# Patient Record
Sex: FEMALE | Race: WHITE | NOT HISPANIC OR LATINO | URBAN - METROPOLITAN AREA
[De-identification: names, ages, dates, MRNs, and addresses within clinical notes are randomized per-mention and may not be internally consistent; named-entity substitution may affect disease eponyms.]

---

## 2021-09-13 ENCOUNTER — LAB OUTSIDE AN ENCOUNTER (OUTPATIENT)
Dept: URBAN - METROPOLITAN AREA CLINIC 40 | Facility: CLINIC | Age: 46
End: 2021-09-13

## 2021-09-13 ENCOUNTER — TELEPHONE ENCOUNTER (OUTPATIENT)
Dept: URBAN - METROPOLITAN AREA CLINIC 40 | Facility: CLINIC | Age: 46
End: 2021-09-13

## 2021-09-13 ENCOUNTER — OFFICE VISIT (OUTPATIENT)
Dept: URBAN - METROPOLITAN AREA CLINIC 40 | Facility: CLINIC | Age: 46
End: 2021-09-13
Payer: MEDICARE

## 2021-09-13 ENCOUNTER — WEB ENCOUNTER (OUTPATIENT)
Dept: URBAN - METROPOLITAN AREA CLINIC 40 | Facility: CLINIC | Age: 46
End: 2021-09-13

## 2021-09-13 VITALS
HEART RATE: 80 BPM | TEMPERATURE: 97.7 F | HEIGHT: 65 IN | WEIGHT: 293 LBS | DIASTOLIC BLOOD PRESSURE: 80 MMHG | SYSTOLIC BLOOD PRESSURE: 130 MMHG | BODY MASS INDEX: 48.82 KG/M2

## 2021-09-13 DIAGNOSIS — K58.0 IRRITABLE BOWEL SYNDROME WITH DIARRHEA: ICD-10-CM

## 2021-09-13 DIAGNOSIS — Z86.19 HX OF HEPATITIS C: ICD-10-CM

## 2021-09-13 DIAGNOSIS — R10.11 ABDOMINAL PAIN, RUQ: ICD-10-CM

## 2021-09-13 DIAGNOSIS — R10.12 ABDOMINAL PAIN, LEFT UPPER QUADRANT: ICD-10-CM

## 2021-09-13 DIAGNOSIS — Z86.19 HX OF HELICOBACTER INFECTION: ICD-10-CM

## 2021-09-13 DIAGNOSIS — K76.0 FATTY LIVER: ICD-10-CM

## 2021-09-13 DIAGNOSIS — R10.84 ABDOMINAL PAIN, GENERALIZED: ICD-10-CM

## 2021-09-13 PROBLEM — 197321007: Status: ACTIVE | Noted: 2021-09-13

## 2021-09-13 PROCEDURE — 99204 OFFICE O/P NEW MOD 45 MIN: CPT | Performed by: INTERNAL MEDICINE

## 2021-09-13 PROCEDURE — 99244 OFF/OP CNSLTJ NEW/EST MOD 40: CPT | Performed by: INTERNAL MEDICINE

## 2021-09-13 RX ORDER — LEVOTHYROXINE SODIUM 25 UG/1
TABLET ORAL
Qty: 0 | Refills: 0 | Status: ACTIVE | COMMUNITY
Start: 1900-01-01

## 2021-09-13 RX ORDER — ATENOLOL 25 MG/1
TABLET ORAL
Qty: 0 | Refills: 0 | Status: ACTIVE | COMMUNITY
Start: 1900-01-01

## 2021-09-13 RX ORDER — LISINOPRIL AND HYDROCHLOROTHIAZIDE TABLETS 20; 25 MG/1; MG/1
TABLET ORAL
Qty: 0 | Refills: 0 | Status: ACTIVE | COMMUNITY
Start: 1900-01-01

## 2021-09-13 RX ORDER — DICYCLOMINE HYDROCHLORIDE 20 MG/1
1 TABLET TABLET ORAL THREE TIMES A DAY
Qty: 270 TABLET | Refills: 3 | OUTPATIENT
Start: 2021-09-13 | End: 2022-09-08

## 2021-09-13 RX ORDER — GLIPIZIDE 5 MG/1
TABLET ORAL
Qty: 0 | Refills: 0 | Status: ACTIVE | COMMUNITY
Start: 1900-01-01

## 2021-09-13 RX ORDER — TRAZODONE HYDROCHLORIDE 100 MG/1
1 TABLET AT BEDTIME TABLET, FILM COATED ORAL ONCE A DAY
Status: ACTIVE | COMMUNITY

## 2021-09-13 RX ORDER — METFORMIN HYDROCHLORIDE 1000 MG/1
TAKE 1 TABLET (1,000 MG) BY ORAL ROUTE 2 TIMES PER DAY WITH MORNING AND EVENING MEALS TABLET, COATED ORAL 2
Qty: 0 | Refills: 0 | Status: ACTIVE | COMMUNITY
Start: 1900-01-01

## 2021-09-13 RX ORDER — PROMETHAZINE HYDROCHLORIDE 25 MG/1
TAKE 1 TABLET (25 MG) BY ORAL ROUTE EVERY 4-6 HOURS AS NEEDED TABLET ORAL
Qty: 30 | Refills: 3 | Status: ACTIVE | COMMUNITY
Start: 2017-05-25

## 2021-09-13 RX ORDER — PANTOPRAZOLE SODIUM 40 MG
TAKE 1 TABLET BY ORAL ROUTE 2 TIMES A DAY FOR 30 DAYS TABLET, DELAYED RELEASE (ENTERIC COATED) ORAL 2
Qty: 60 | Refills: 5 | Status: ACTIVE | COMMUNITY
Start: 2017-05-25

## 2021-09-13 RX ORDER — OXYCODONE HYDROCHLORIDE 5 MG/1
CAPSULE ORAL
Qty: 0 | Refills: 0 | Status: DISCONTINUED | COMMUNITY
Start: 1900-01-01

## 2021-09-13 NOTE — HPI-TODAY'S VISIT:
Patient presents for evaluation of left upper quadrant abdominal pain extensively previously.  Colonoscopy 2018 was negative for inflammatory bowel disease, minimal diverticulosis and hemorrhoids noted but otherwise negative study.  EGD 2017 essentially unremarkable.  Mild inflammation previously but no H. pylori bacteria on any exams.  EGD 2014 with no acute abnormality.  EGD 2013 and EGD 2012 also essentially unremarkable.  History of elevated liver enzymes, previous liver biopsy 2011 showed chronic active hepatitis with mild interface and lobular activity consistent with autoimmune hepatitis, at that time she was grade 2 stage II.  CBC and CMP were normal 2018 last visit  CT abdomen 2018 showed no evidence of liver cirrhosis, otherwise negative for acute abnormality other than diverticulosis.  Active problem list:  Chronic abdominal pain, pelvic pain, GERD, IBS, fatty liver and possible autoimmune hepatitis.  She saw Dr. Najera at the liver clinic, was subsequently diagnosed with hepatitis C genotype 1a.  Dr. Najera felt she had a combination of fatty liver with mild fibrosis in combination with hepatitis C as the etiology of her grade 2 fibrosis, she was also on psychiatric medicines at the time which may have been playing a role.  History of H. pylori gastritis.  Her hepatitis C was treated in 2014.  SVR achieved, HCV undetectable as of 2014. she was doing well and taking Protonix bid long term. Then she started having flare of diarrhea and pain. Now RUQ radiating to her back often, moderate, dull, no a/a factors. Also LUQ pain and left flank pain with moderate watery diarrhea daily, no bleeding, no a/a factors.

## 2021-09-16 ENCOUNTER — TELEPHONE ENCOUNTER (OUTPATIENT)
Dept: URBAN - METROPOLITAN AREA CLINIC 40 | Facility: CLINIC | Age: 46
End: 2021-09-16

## 2021-09-16 ENCOUNTER — OFFICE VISIT (OUTPATIENT)
Dept: URBAN - METROPOLITAN AREA SURGERY CENTER 30 | Facility: SURGERY CENTER | Age: 46
End: 2021-09-16
Payer: MEDICARE

## 2021-09-16 ENCOUNTER — CLAIMS CREATED FROM THE CLAIM WINDOW (OUTPATIENT)
Dept: URBAN - METROPOLITAN AREA CLINIC 4 | Facility: CLINIC | Age: 46
End: 2021-09-16
Payer: MEDICARE

## 2021-09-16 DIAGNOSIS — R10.13 ABDOMINAL DISCOMFORT, EPIGASTRIC: ICD-10-CM

## 2021-09-16 DIAGNOSIS — R11.2 ACUTE NAUSEA WITH NONBILIOUS VOMITING: ICD-10-CM

## 2021-09-16 DIAGNOSIS — K31.89 NONSURGICAL DUMPING SYNDROME: ICD-10-CM

## 2021-09-16 PROCEDURE — 43239 EGD BIOPSY SINGLE/MULTIPLE: CPT | Performed by: INTERNAL MEDICINE

## 2021-09-16 PROCEDURE — G8907 PT DOC NO EVENTS ON DISCHARG: HCPCS | Performed by: INTERNAL MEDICINE

## 2021-09-16 PROCEDURE — 88305 TISSUE EXAM BY PATHOLOGIST: CPT | Performed by: PATHOLOGY

## 2021-09-21 ENCOUNTER — TELEPHONE ENCOUNTER (OUTPATIENT)
Dept: URBAN - METROPOLITAN AREA CLINIC 40 | Facility: CLINIC | Age: 46
End: 2021-09-21

## 2021-09-21 RX ORDER — PANTOPRAZOLE SODIUM 40 MG
TAKE 1 TABLET BY ORAL ROUTE 2 TIMES A DAY FOR 30 DAYS TABLET, DELAYED RELEASE (ENTERIC COATED) ORAL 2
Qty: 60 | Refills: 5 | Status: ACTIVE | COMMUNITY
Start: 2017-05-25

## 2021-09-21 RX ORDER — CIPROFLOXACIN 500 MG/1
1 TABLET TABLET, FILM COATED ORAL
Qty: 14 TABLET | OUTPATIENT
Start: 2021-09-22 | End: 2021-09-29

## 2021-09-21 RX ORDER — GLIPIZIDE 5 MG/1
TABLET ORAL
Qty: 0 | Refills: 0 | Status: ACTIVE | COMMUNITY
Start: 1900-01-01

## 2021-09-21 RX ORDER — LEVOTHYROXINE SODIUM 25 UG/1
TABLET ORAL
Qty: 0 | Refills: 0 | Status: ACTIVE | COMMUNITY
Start: 1900-01-01

## 2021-09-21 RX ORDER — METRONIDAZOLE 500 MG/1
1 TABLET TABLET, FILM COATED ORAL THREE TIMES A DAY
Qty: 21 TABLET | OUTPATIENT
Start: 2021-09-22 | End: 2021-09-29

## 2021-09-21 RX ORDER — TRAZODONE HYDROCHLORIDE 100 MG/1
1 TABLET AT BEDTIME TABLET, FILM COATED ORAL ONCE A DAY
Status: ACTIVE | COMMUNITY

## 2021-09-21 RX ORDER — PROMETHAZINE HYDROCHLORIDE 25 MG/1
TAKE 1 TABLET (25 MG) BY ORAL ROUTE EVERY 4-6 HOURS AS NEEDED TABLET ORAL
Qty: 30 | Refills: 3 | Status: ACTIVE | COMMUNITY
Start: 2017-05-25

## 2021-09-21 RX ORDER — DICYCLOMINE HYDROCHLORIDE 20 MG/1
1 TABLET TABLET ORAL THREE TIMES A DAY
Qty: 270 TABLET | Refills: 3 | Status: ACTIVE | COMMUNITY
Start: 2021-09-13 | End: 2022-09-08

## 2021-09-21 RX ORDER — METFORMIN HYDROCHLORIDE 1000 MG/1
TAKE 1 TABLET (1,000 MG) BY ORAL ROUTE 2 TIMES PER DAY WITH MORNING AND EVENING MEALS TABLET, COATED ORAL 2
Qty: 0 | Refills: 0 | Status: ACTIVE | COMMUNITY
Start: 1900-01-01

## 2021-09-21 RX ORDER — LISINOPRIL AND HYDROCHLOROTHIAZIDE TABLETS 20; 25 MG/1; MG/1
TABLET ORAL
Qty: 0 | Refills: 0 | Status: ACTIVE | COMMUNITY
Start: 1900-01-01

## 2021-09-21 RX ORDER — ATENOLOL 25 MG/1
TABLET ORAL
Qty: 0 | Refills: 0 | Status: ACTIVE | COMMUNITY
Start: 1900-01-01

## 2021-09-22 ENCOUNTER — LAB OUTSIDE AN ENCOUNTER (OUTPATIENT)
Dept: URBAN - METROPOLITAN AREA CLINIC 40 | Facility: CLINIC | Age: 46
End: 2021-09-22

## 2021-09-30 ENCOUNTER — OFFICE VISIT (OUTPATIENT)
Dept: URBAN - METROPOLITAN AREA CLINIC 40 | Facility: CLINIC | Age: 46
End: 2021-09-30
Payer: MEDICARE

## 2021-09-30 DIAGNOSIS — K57.92 DIVERTICULITIS: ICD-10-CM

## 2021-09-30 DIAGNOSIS — K58.0 IRRITABLE BOWEL SYNDROME WITH DIARRHEA: ICD-10-CM

## 2021-09-30 DIAGNOSIS — E11.9 TYPE 2 DIABETES MELLITUS WITHOUT COMPLICATION, UNSPECIFIED WHETHER LONG TERM INSULIN USE: ICD-10-CM

## 2021-09-30 DIAGNOSIS — R10.84 ABDOMINAL PAIN, GENERALIZED: ICD-10-CM

## 2021-09-30 DIAGNOSIS — T18.2XXA GASTRIC BEZOAR, INITIAL ENCOUNTER: ICD-10-CM

## 2021-09-30 PROCEDURE — 99214 OFFICE O/P EST MOD 30 MIN: CPT | Performed by: INTERNAL MEDICINE

## 2021-09-30 RX ORDER — GLIPIZIDE 5 MG/1
TABLET ORAL
Qty: 0 | Refills: 0 | Status: ACTIVE | COMMUNITY
Start: 1900-01-01

## 2021-09-30 RX ORDER — METFORMIN HYDROCHLORIDE 1000 MG/1
TAKE 1 TABLET (1,000 MG) BY ORAL ROUTE 2 TIMES PER DAY WITH MORNING AND EVENING MEALS TABLET, COATED ORAL 2
Qty: 0 | Refills: 0 | Status: ACTIVE | COMMUNITY
Start: 1900-01-01

## 2021-09-30 RX ORDER — LEVOTHYROXINE SODIUM 25 UG/1
TABLET ORAL
Qty: 0 | Refills: 0 | Status: ACTIVE | COMMUNITY
Start: 1900-01-01

## 2021-09-30 RX ORDER — PANTOPRAZOLE SODIUM 40 MG
TAKE 1 TABLET BY ORAL ROUTE 2 TIMES A DAY FOR 30 DAYS TABLET, DELAYED RELEASE (ENTERIC COATED) ORAL 2
Qty: 60 | Refills: 5 | Status: ACTIVE | COMMUNITY
Start: 2017-05-25

## 2021-09-30 RX ORDER — ATENOLOL 25 MG/1
TABLET ORAL
Qty: 0 | Refills: 0 | Status: ACTIVE | COMMUNITY
Start: 1900-01-01

## 2021-09-30 RX ORDER — DICYCLOMINE HYDROCHLORIDE 20 MG/1
1 TABLET TABLET ORAL THREE TIMES A DAY
Qty: 270 TABLET | Refills: 3 | Status: ACTIVE | COMMUNITY
Start: 2021-09-13 | End: 2022-09-08

## 2021-09-30 RX ORDER — LISINOPRIL AND HYDROCHLOROTHIAZIDE TABLETS 20; 25 MG/1; MG/1
TABLET ORAL
Qty: 0 | Refills: 0 | Status: ACTIVE | COMMUNITY
Start: 1900-01-01

## 2021-09-30 RX ORDER — TRAZODONE HYDROCHLORIDE 100 MG/1
1 TABLET AT BEDTIME TABLET, FILM COATED ORAL ONCE A DAY
Status: ACTIVE | COMMUNITY

## 2021-09-30 RX ORDER — PROMETHAZINE HYDROCHLORIDE 25 MG/1
TAKE 1 TABLET (25 MG) BY ORAL ROUTE EVERY 4-6 HOURS AS NEEDED TABLET ORAL
Qty: 30 | Refills: 3 | Status: ACTIVE | COMMUNITY
Start: 2017-05-25

## 2021-09-30 NOTE — HPI-TODAY'S VISIT:
Patient presents for evaluation of left upper quadrant abdominal pain extensively previously.  EGD the other day was negative, bx negative for H.pylori. CT showed mild diverticulitis and this was treated with Cipro/Flagyl. HIDA normal. GET normal. ----- Colonoscopy 2018 was negative for inflammatory bowel disease, minimal diverticulosis and hemorrhoids noted but otherwise negative study.  EGD 2017 essentially unremarkable.  Mild inflammation previously but no H. pylori bacteria on any exams.  EGD 2014 with no acute abnormality.  EGD 2013 and EGD 2012 also essentially unremarkable.  History of elevated liver enzymes, previous liver biopsy 2011 showed chronic active hepatitis with mild interface and lobular activity consistent with autoimmune hepatitis, at that time she was grade 2 stage II.  CBC and CMP were normal 2018 last visit  CT abdomen 2018 showed no evidence of liver cirrhosis, otherwise negative for acute abnormality other than diverticulosis.  Active problem list:  Chronic abdominal pain, pelvic pain, GERD, IBS, fatty liver and possible autoimmune hepatitis.  She saw Dr. Najera at the liver clinic, was subsequently diagnosed with hepatitis C genotype 1a.  Dr. Najera felt she had a combination of fatty liver with mild fibrosis in combination with hepatitis C as the etiology of her grade 2 fibrosis, she was also on psychiatric medicines at the time which may have been playing a role.  History of H. pylori gastritis.  Her hepatitis C was treated in 2014.  SVR achieved, HCV undetectable as of 2014. she was doing well and taking Protonix bid long term. Then she started having flare of diarrhea and pain. Now RUQ radiating to her back often, moderate, dull, no a/a factors. Also LUQ pain and left flank pain with moderate watery diarrhea daily, no bleeding, no a/a factors.

## 2022-08-12 ENCOUNTER — OFFICE VISIT (OUTPATIENT)
Dept: URBAN - METROPOLITAN AREA CLINIC 40 | Facility: CLINIC | Age: 47
End: 2022-08-12
Payer: MEDICARE

## 2022-08-12 ENCOUNTER — DASHBOARD ENCOUNTERS (OUTPATIENT)
Age: 47
End: 2022-08-12

## 2022-08-12 ENCOUNTER — TELEPHONE ENCOUNTER (OUTPATIENT)
Dept: URBAN - METROPOLITAN AREA CLINIC 40 | Facility: CLINIC | Age: 47
End: 2022-08-12

## 2022-08-12 VITALS
BODY MASS INDEX: 48.82 KG/M2 | DIASTOLIC BLOOD PRESSURE: 80 MMHG | WEIGHT: 293 LBS | HEIGHT: 65 IN | HEART RATE: 79 BPM | SYSTOLIC BLOOD PRESSURE: 144 MMHG

## 2022-08-12 DIAGNOSIS — E11.9 TYPE 2 DIABETES MELLITUS WITHOUT COMPLICATION, UNSPECIFIED WHETHER LONG TERM INSULIN USE: ICD-10-CM

## 2022-08-12 DIAGNOSIS — R19.7 ACUTE DIARRHEA: ICD-10-CM

## 2022-08-12 DIAGNOSIS — U07.1 COVID: ICD-10-CM

## 2022-08-12 DIAGNOSIS — R11.14 BILIOUS VOMITING WITH NAUSEA: ICD-10-CM

## 2022-08-12 DIAGNOSIS — K58.0 IRRITABLE BOWEL SYNDROME WITH DIARRHEA: ICD-10-CM

## 2022-08-12 DIAGNOSIS — R10.84 ABDOMINAL PAIN, GENERALIZED: ICD-10-CM

## 2022-08-12 PROBLEM — 197125005: Status: ACTIVE | Noted: 2021-09-13

## 2022-08-12 PROBLEM — 307496006 DIVERTICULITIS: Status: ACTIVE | Noted: 2021-09-22

## 2022-08-12 PROBLEM — 313436004 TYPE II DIABETES MELLITUS WITHOUT COMPLICATION: Status: ACTIVE | Noted: 2021-09-22

## 2022-08-12 PROCEDURE — 99214 OFFICE O/P EST MOD 30 MIN: CPT | Performed by: INTERNAL MEDICINE

## 2022-08-12 RX ORDER — PANTOPRAZOLE SODIUM 40 MG
TAKE 1 TABLET BY ORAL ROUTE 2 TIMES A DAY FOR 30 DAYS TABLET, DELAYED RELEASE (ENTERIC COATED) ORAL 2
Qty: 60 | Refills: 5 | Status: ACTIVE | COMMUNITY
Start: 2017-05-25

## 2022-08-12 RX ORDER — GLIPIZIDE 5 MG/1
TABLET ORAL
Qty: 0 | Refills: 0 | Status: ACTIVE | COMMUNITY
Start: 1900-01-01

## 2022-08-12 RX ORDER — LISINOPRIL AND HYDROCHLOROTHIAZIDE TABLETS 20; 25 MG/1; MG/1
TABLET ORAL
Qty: 0 | Refills: 0 | Status: ACTIVE | COMMUNITY
Start: 1900-01-01

## 2022-08-12 RX ORDER — PROMETHAZINE HYDROCHLORIDE 25 MG/1
TAKE 1 TABLET (25 MG) BY ORAL ROUTE EVERY 4-6 HOURS AS NEEDED TABLET ORAL
Qty: 30 | Refills: 3 | Status: ACTIVE | COMMUNITY
Start: 2017-05-25

## 2022-08-12 RX ORDER — METFORMIN HYDROCHLORIDE 1000 MG/1
TAKE 1 TABLET (1,000 MG) BY ORAL ROUTE 2 TIMES PER DAY WITH MORNING AND EVENING MEALS TABLET, COATED ORAL 2
Qty: 0 | Refills: 0 | Status: ACTIVE | COMMUNITY
Start: 1900-01-01

## 2022-08-12 RX ORDER — TRAZODONE HYDROCHLORIDE 100 MG/1
1 TABLET AT BEDTIME TABLET, FILM COATED ORAL ONCE A DAY
Status: ACTIVE | COMMUNITY

## 2022-08-12 RX ORDER — DICYCLOMINE HYDROCHLORIDE 20 MG/1
1 TABLET TABLET ORAL THREE TIMES A DAY
Qty: 270 TABLET | Refills: 3 | Status: ACTIVE | COMMUNITY
Start: 2021-09-13 | End: 2022-09-08

## 2022-08-12 RX ORDER — ATENOLOL 25 MG/1
TABLET ORAL
Qty: 0 | Refills: 0 | Status: ACTIVE | COMMUNITY
Start: 1900-01-01

## 2022-08-12 RX ORDER — LEVOTHYROXINE SODIUM 25 UG/1
TABLET ORAL
Qty: 0 | Refills: 0 | Status: ACTIVE | COMMUNITY
Start: 1900-01-01

## 2022-08-12 NOTE — HPI-TODAY'S VISIT:
Patient presents for evaluation via  call center. She was seen at [facility] just a few hours ago. They told her to f/u with GI and she is here same day.  Patient had COVID 10 days ago, she was treated as an outpatient.  Her and her partner eventually had resolution of her upper respiratory symptoms.  5 days ago she developed acute onset nausea vomiting diarrhea.  She has had over 10 watery stools daily.  Fecal urgency.  Diffuse abdominal cramps.  Nausea vomiting and multiple episodes of vomiting.  She was concerned about dehydration.  She felt lightheaded.  Due to the ongoing GI upset she was seen at [facility] today just a few hours ago.  CT scan is reviewed was negative, CBC, CMP, lipase, urinalysis as reviewed were negative.  COVID-negative.  She was told to follow-up with GI and given a prescription for Phenergan.  She took her Phenergan a few hours ago and actually feels much better and is had no vomiting since that time.  No recent antibiotics.  Patient has an extensive GI history as noted below.  She is well-known to the practice.   ===== Prior note: Hx  of left upper quadrant abdominal pain extensively previously.  EGD 2021 was negative, bx negative for H.pylori. CT showed mild diverticulitis 2021 and this was treated with Cipro/Flagyl. HIDA normal. GET normal. Colonoscopy 2018 was negative for inflammatory bowel disease, minimal diverticulosis and hemorrhoids noted but otherwise negative study.  EGD 2017 essentially unremarkable.  Mild inflammation previously but no H. pylori bacteria on any exams.  EGD 2014 with no acute abnormality.  EGD 2013 and EGD 2012 also essentially unremarkable.  History of elevated liver enzymes, previous liver biopsy 2011 showed chronic active hepatitis with mild interface and lobular activity consistent with autoimmune hepatitis, at that time she was grade 2 stage II.   Chronic abdominal pain, pelvic pain, GERD, IBS, fatty liver and possible autoimmune hepatitis.  She saw Dr. Najera at the liver clinic, was subsequently diagnosed with hepatitis C genotype 1a.   History of H. pylori gastritis.  Her hepatitis C was treated in 2014.  SVR achieved, HCV undetectable as of 2014. she was doing well and taking Protonix bid long term. Then she started having flare of diarrhea and pain. Now

## 2022-10-06 ENCOUNTER — TELEPHONE ENCOUNTER (OUTPATIENT)
Dept: URBAN - METROPOLITAN AREA CLINIC 40 | Facility: CLINIC | Age: 47
End: 2022-10-06

## 2022-10-11 ENCOUNTER — LAB OUTSIDE AN ENCOUNTER (OUTPATIENT)
Dept: URBAN - METROPOLITAN AREA CLINIC 40 | Facility: CLINIC | Age: 47
End: 2022-10-11

## 2022-10-19 LAB — GASTROINTESTINAL PATHOGEN: (no result)

## 2023-01-30 ENCOUNTER — WEB ENCOUNTER (OUTPATIENT)
Dept: URBAN - METROPOLITAN AREA CLINIC 40 | Facility: CLINIC | Age: 48
End: 2023-01-30

## 2023-01-30 RX ORDER — PROMETHAZINE HYDROCHLORIDE 25 MG/1
TAKE 1 TABLET (25 MG) BY ORAL ROUTE EVERY 4-6 HOURS AS NEEDED TABLET ORAL
Qty: 30 | Refills: 3
Start: 2017-05-25

## 2023-10-06 ENCOUNTER — WEB ENCOUNTER (OUTPATIENT)
Dept: URBAN - METROPOLITAN AREA CLINIC 40 | Facility: CLINIC | Age: 48
End: 2023-10-06

## 2023-10-06 RX ORDER — PROMETHAZINE HYDROCHLORIDE 25 MG/1
TAKE 1 TABLET (25 MG) BY ORAL ROUTE EVERY 4-6 HOURS AS NEEDED TABLET ORAL
Qty: 30 | Refills: 3
Start: 2017-05-25

## 2024-11-21 ENCOUNTER — WEB ENCOUNTER (OUTPATIENT)
Dept: URBAN - METROPOLITAN AREA CLINIC 40 | Facility: CLINIC | Age: 49
End: 2024-11-21

## 2024-11-21 RX ORDER — PANTOPRAZOLE SODIUM 40 MG
TAKE 1 TABLET BY ORAL ROUTE 2 TIMES A DAY FOR 30 DAYS TABLET, DELAYED RELEASE (ENTERIC COATED) ORAL 2
Qty: 30 | Refills: 3
Start: 2017-05-25

## 2024-11-21 RX ORDER — PROMETHAZINE HYDROCHLORIDE 25 MG/1
TAKE 1 TABLET (25 MG) BY ORAL ROUTE EVERY 4-6 HOURS AS NEEDED TABLET ORAL
Qty: 60 | Refills: 3
Start: 2017-05-25 | End: 2025-03-22

## 2025-03-12 ENCOUNTER — TELEPHONE ENCOUNTER (OUTPATIENT)
Dept: URBAN - METROPOLITAN AREA CLINIC 40 | Facility: CLINIC | Age: 50
End: 2025-03-12